# Patient Record
(demographics unavailable — no encounter records)

---

## 2024-10-31 NOTE — HISTORY OF PRESENT ILLNESS
[No Pertinent Cardiac History] : no history of aortic stenosis, atrial fibrillation, coronary artery disease, recent myocardial infarction, or implantable device/pacemaker [No Pertinent Pulmonary History] : no history of asthma, COPD, sleep apnea, or smoking [No Adverse Anesthesia Reaction] : no adverse anesthesia reaction in self or family member [(Patient denies any chest pain, claudication, dyspnea on exertion, orthopnea, palpitations or syncope)] : Patient denies any chest pain, claudication, dyspnea on exertion, orthopnea, palpitations or syncope [Good (7-10 METs)] : Good (7-10 METs) [Chronic Anticoagulation] : no chronic anticoagulation [Chronic Kidney Disease] : no chronic kidney disease [Diabetes] : no diabetes [FreeTextEntry2] : 11/25 [FreeTextEntry1] : Left inguinal hernia repair [FreeTextEntry3] : Maryanne  [FreeTextEntry4] : 53 yo m here for preop clearance  Left inguinal hernia that causes some discomfort when he sneezes or lifts heavy objects   Denies any change in medical history and daily medications including otc   Active lifestyle - exercises multiple times a week   Hx of surgery - denies any issues with anesthesia   Denies any acute concerns

## 2024-10-31 NOTE — HISTORY OF PRESENT ILLNESS
[No Pertinent Cardiac History] : no history of aortic stenosis, atrial fibrillation, coronary artery disease, recent myocardial infarction, or implantable device/pacemaker [No Pertinent Pulmonary History] : no history of asthma, COPD, sleep apnea, or smoking [No Adverse Anesthesia Reaction] : no adverse anesthesia reaction in self or family member [(Patient denies any chest pain, claudication, dyspnea on exertion, orthopnea, palpitations or syncope)] : Patient denies any chest pain, claudication, dyspnea on exertion, orthopnea, palpitations or syncope [Good (7-10 METs)] : Good (7-10 METs) [Chronic Anticoagulation] : no chronic anticoagulation [Chronic Kidney Disease] : no chronic kidney disease [Diabetes] : no diabetes [FreeTextEntry1] : Left inguinal hernia repair [FreeTextEntry2] : 11/25 [FreeTextEntry3] : Maryanne  [FreeTextEntry4] : 53 yo m here for preop clearance  Left inguinal hernia that causes some discomfort when he sneezes or lifts heavy objects   Denies any change in medical history and daily medications including otc   Active lifestyle - exercises multiple times a week   Hx of surgery - denies any issues with anesthesia   Denies any acute concerns

## 2024-12-10 NOTE — ASSESSMENT
[FreeTextEntry1] : 52 year old male having a stable postoperative course 2 weeks s/p lap bilateral inguinal hernia repairs. Patient initially had some discomfort but since yesterday he is feeling well with minimal pain, not necessitating pain relievers. Advised he may take NSAIDs if needed. Recovering well with no issues. Instructed to refrain from heavy lifting for three weeks, otherwise may advance physical activity as tolerated.

## 2024-12-10 NOTE — HISTORY OF PRESENT ILLNESS
[de-identified] : Mr. Linton presents today for a postoperative visit 2 weeks s/p laparoscopic bilateral inguinal hernias repair on 11/25/24. Patient reports he is doing well. Denies pain, n/v, fevers, chest pain, sob or PO intolerance. Tolerating a regular diet well with no difficulty. Voiding, passing gas and having regular bowel movements. Had some constipation at [facility] but this ahs improved. Resuming regular activities as tolerated.

## 2024-12-10 NOTE — PHYSICAL EXAM
[Normal Breath Sounds] : Normal breath sounds [Normal Heart Sounds] : normal heart sounds [Normal Rate and Rhythm] : normal rate and rhythm [No HSM] : no hepatosplenomegaly [No Rash or Lesion] : No rash or lesion [Alert] : alert [Oriented to Person] : oriented to person [Oriented to Place] : oriented to place [Oriented to Time] : oriented to time [Calm] : calm [de-identified] : well appearing, in no acute distress [de-identified] : 4 healing lap sites with dermabond with no signs of infection. Soft, non tender to palpation, normoactive bowel sounds [de-identified] : wnl

## 2024-12-10 NOTE — REASON FOR VISIT
[Post Op: _________] : a [unfilled] post op visit [FreeTextEntry1] : laparoscopic bilateral inguinal hernia repair on 11/25/24